# Patient Record
Sex: FEMALE | Race: WHITE | Employment: UNEMPLOYED | URBAN - METROPOLITAN AREA
[De-identification: names, ages, dates, MRNs, and addresses within clinical notes are randomized per-mention and may not be internally consistent; named-entity substitution may affect disease eponyms.]

---

## 2021-08-05 ENCOUNTER — PRENATAL OFFICE VISIT (OUTPATIENT)
Dept: OBGYN | Facility: CLINIC | Age: 36
End: 2021-08-05

## 2021-08-05 DIAGNOSIS — Z34.80 PRENATAL CARE, SUBSEQUENT PREGNANCY: ICD-10-CM

## 2021-08-05 PROCEDURE — 99207 PR NO CHARGE NURSE ONLY: CPT | Performed by: OBSTETRICS & GYNECOLOGY

## 2021-08-05 NOTE — PROGRESS NOTES
Lifestyle and nutrition teaching completed   [facility] OB Intake Nurse    Patient supplied answers from flow sheet for:  Prenatal OB Questionnaire.  Past Medical History  Have you ever recieved care for your mental health? : No  Have you ever been in a major accident or suffered serious trauma?: No  Within the last year, has anyone hit, slapped, kicked or otherwise hurt you?: No  In the last year, has anyone forced you to have sex when you didn't want to?: No    Past Medical History 2   Have you ever received a blood transfusion?: No  Would you accept a blood transfusion if was medically recommended?: Yes  Does anyone in your home smoke?: No   Is your blood type Rh negative?: Unknown  Have you ever ?: No  Have you been hospitalized for a nonsurgical reason excluding normal delivery?: No  Have you ever had an abnormal pap smear?: No    Past Medical History (Continued)  Do you have a history of abnormalities of the uterus?: No  Did your mother take GIAN or any other hormones when she was pregnant with you?: No  Do you have any other problems we have not asked about which you feel may be important to this pregnancy?: No

## 2021-08-10 PROBLEM — Z87.59 HISTORY OF PRETERM PREMATURE RUPTURE OF MEMBRANES (PPROM): Status: ACTIVE | Noted: 2021-08-10

## 2021-09-23 ENCOUNTER — PRENATAL OFFICE VISIT (OUTPATIENT)
Dept: OBGYN | Facility: CLINIC | Age: 36
End: 2021-09-23
Payer: COMMERCIAL

## 2021-09-23 VITALS
BODY MASS INDEX: 25.77 KG/M2 | HEIGHT: 61 IN | RESPIRATION RATE: 16 BRPM | HEART RATE: 91 BPM | TEMPERATURE: 97.7 F | SYSTOLIC BLOOD PRESSURE: 112 MMHG | WEIGHT: 136.5 LBS | DIASTOLIC BLOOD PRESSURE: 70 MMHG

## 2021-09-23 DIAGNOSIS — Z34.82 PRENATAL CARE, SUBSEQUENT PREGNANCY IN SECOND TRIMESTER: Primary | ICD-10-CM

## 2021-09-23 DIAGNOSIS — Z87.59 HISTORY OF PRETERM PREMATURE RUPTURE OF MEMBRANES (PPROM): ICD-10-CM

## 2021-09-23 LAB
ALBUMIN UR-MCNC: NEGATIVE MG/DL
APPEARANCE UR: CLEAR
BILIRUB UR QL STRIP: NEGATIVE
COLOR UR AUTO: YELLOW
ERYTHROCYTE [DISTWIDTH] IN BLOOD BY AUTOMATED COUNT: 12.3 % (ref 10–15)
GLUCOSE UR STRIP-MCNC: NEGATIVE MG/DL
HBV SURFACE AG SERPL QL IA: NONREACTIVE
HCT VFR BLD AUTO: 38.3 % (ref 35–47)
HCV AB SERPL QL IA: NONREACTIVE
HGB BLD-MCNC: 13 G/DL (ref 11.7–15.7)
HGB UR QL STRIP: ABNORMAL
HIV 1+2 AB+HIV1 P24 AG SERPL QL IA: NONREACTIVE
KETONES UR STRIP-MCNC: NEGATIVE MG/DL
LEUKOCYTE ESTERASE UR QL STRIP: NEGATIVE
MCH RBC QN AUTO: 32.4 PG (ref 26.5–33)
MCHC RBC AUTO-ENTMCNC: 33.9 G/DL (ref 31.5–36.5)
MCV RBC AUTO: 96 FL (ref 78–100)
NITRATE UR QL: NEGATIVE
PH UR STRIP: 6 [PH] (ref 5–7)
PLATELET # BLD AUTO: 294 10E3/UL (ref 150–450)
RBC # BLD AUTO: 4.01 10E6/UL (ref 3.8–5.2)
RBC #/AREA URNS AUTO: ABNORMAL /HPF
SP GR UR STRIP: <=1.005 (ref 1–1.03)
SQUAMOUS #/AREA URNS AUTO: ABNORMAL /LPF
T PALLIDUM AB SER QL: NONREACTIVE
UROBILINOGEN UR STRIP-ACNC: 0.2 E.U./DL
WBC # BLD AUTO: 10.9 10E3/UL (ref 4–11)
WBC #/AREA URNS AUTO: ABNORMAL /HPF

## 2021-09-23 PROCEDURE — 87086 URINE CULTURE/COLONY COUNT: CPT | Performed by: OBSTETRICS & GYNECOLOGY

## 2021-09-23 PROCEDURE — 86762 RUBELLA ANTIBODY: CPT | Performed by: OBSTETRICS & GYNECOLOGY

## 2021-09-23 PROCEDURE — 76815 OB US LIMITED FETUS(S): CPT | Performed by: OBSTETRICS & GYNECOLOGY

## 2021-09-23 PROCEDURE — 87624 HPV HI-RISK TYP POOLED RSLT: CPT | Performed by: OBSTETRICS & GYNECOLOGY

## 2021-09-23 PROCEDURE — 86780 TREPONEMA PALLIDUM: CPT | Performed by: OBSTETRICS & GYNECOLOGY

## 2021-09-23 PROCEDURE — 81001 URINALYSIS AUTO W/SCOPE: CPT | Performed by: OBSTETRICS & GYNECOLOGY

## 2021-09-23 PROCEDURE — 87491 CHLMYD TRACH DNA AMP PROBE: CPT | Performed by: OBSTETRICS & GYNECOLOGY

## 2021-09-23 PROCEDURE — 36415 COLL VENOUS BLD VENIPUNCTURE: CPT | Performed by: OBSTETRICS & GYNECOLOGY

## 2021-09-23 PROCEDURE — 87591 N.GONORRHOEAE DNA AMP PROB: CPT | Performed by: OBSTETRICS & GYNECOLOGY

## 2021-09-23 PROCEDURE — 86803 HEPATITIS C AB TEST: CPT | Performed by: OBSTETRICS & GYNECOLOGY

## 2021-09-23 PROCEDURE — 99203 OFFICE O/P NEW LOW 30 MIN: CPT | Mod: 25 | Performed by: OBSTETRICS & GYNECOLOGY

## 2021-09-23 PROCEDURE — 87340 HEPATITIS B SURFACE AG IA: CPT | Performed by: OBSTETRICS & GYNECOLOGY

## 2021-09-23 PROCEDURE — G0145 SCR C/V CYTO,THINLAYER,RESCR: HCPCS | Performed by: OBSTETRICS & GYNECOLOGY

## 2021-09-23 PROCEDURE — 87389 HIV-1 AG W/HIV-1&-2 AB AG IA: CPT | Performed by: OBSTETRICS & GYNECOLOGY

## 2021-09-23 PROCEDURE — 85027 COMPLETE CBC AUTOMATED: CPT | Performed by: OBSTETRICS & GYNECOLOGY

## 2021-09-23 RX ORDER — HYDROXYPROGESTERONE CAPROATE 250 MG/ML
250 INJECTION INTRAMUSCULAR
Qty: 5 ML | Refills: 11 | Status: ON HOLD | OUTPATIENT
Start: 2021-09-23 | End: 2021-12-30

## 2021-09-23 ASSESSMENT — MIFFLIN-ST. JEOR: SCORE: 1261.54

## 2021-09-23 NOTE — PATIENT INSTRUCTIONS
BIRTH AND 17-alpha hydroxyprogesterone caproate (17P) TREATMENT    What is  birth?      birth is the delivery of a baby before 37 weeks of gestation.  Approximately 1 in every 12 babies in [place] is born premature (that s approximately 6,000 babies every year)!     birth is often unexpected, but can happen for many reasons. There are some known risk factors, which include:   -pregnancy with twins or triplets   -being  race  -some problems with the uterus or cervix   -some medical problems like high blood pressure   -smoking, drinking, or using illegal drugs while pregnant   -infections like urinary tract infection, bacterial vaginosis and chlamydia while    pregnant  -depression, stress, and anxiety    But the biggest risk factor is having a previous  baby. In fact, women who have one  birth have a 30-50% chance of their next baby coming early too.     What are the risks to a baby that delivers prematurely?     birth is the number one cause of  death worldwide. This risk increases the earlier the baby is born.  Prematurity can also cause serious long term health problems for babies such as breathing problems, infections, bleeding into the brain, as well as long term developmental problems like cerebral palsy, learning impairments, hearing and vision problems.    How can I prevent  birth in my pregnancy?  Overall, the best thing to prevent  delivery is to stay healthy during your pregnancy, and follow up with your doctor for your regular visits and screening tests.  If you have a history of  birth in one of your past pregnancies, you may benefit from weekly injections of 17P.     What is 17P?  The full name is 17-alpha hydroxyprogesterone caproate. This is a form of your body s natural  pregnancy hormone , progesterone. The brand name of this is Rosemary, and it is FDA approved for prevention of  birth.  This medication  is given in once weekly injections from week 16-20 through the 36th week of pregnancy. Research shows that women taking 17P can reduce their risk of  birth from 50% to 36%. The treatment has also been shown to reduce the risk of complications after birth, such as bleeding in the brain and need for supplemental oxygen.    It is important to complete the treatment once you start, as the risk of  birth goes up if you stop the injections early.    How can I get started on the Rosemary treatment?  First, you should talk with your doctor to find out if this is a good option for you.  It is safe for pregnant women and for the fetus, but if you have some medical problems like uncontrolled blood pressure, breast cancer, or liver disease you should talk about it with your doctor first.  Your clinic will work with you to help determine insurance coverage and preauthorization if needed.  Then you will schedule weekly visits for 17P injections in addition to your routine prenatal visits with your doctor.    Side Effects of Mekena  The most common side effects  reported by Raemon users are   injection site reactions (pain [35%], swelling  [17%], pruritus (itching) [6%], nodule [5%]), urticaria (rash) (12%), pruritus (generalized itching (8%), nausea (6%), and diarrhea (2%).

## 2021-09-23 NOTE — PROGRESS NOTES
[facility]   OB/GYN Clinic    CC: New OB     Subjective:    Lara is a 35 year old  at 18w4d by 12w3d US us who presents for her initial OB visit. She reports feeling well. No VB or LOF, no ctx.       OB History    Para Term  AB Living   3 1 0 1 1 1   SAB TAB Ectopic Multiple Live Births   1 0 0 0 1      # Outcome Date GA Lbr Kosta/2nd Weight Sex Delivery Anes PTL Lv   3 Current            2  10/10/13 34w2d 04:00 / 00:22 2.32 kg (5 lb 1.8 oz) M Vag-Spont EPI  [place]      Complications:  premature rupture of membranes (PPROM) delivered, current hospitalization      Name: Marcial      Apgar1: 8     1 2011     SAB         Obstetric Comments   Missed    Born in [place]     Past Medical History:   Diagnosis Date     Asthma     as a child     Chickenpox      Congenital heart defect     born with hole in heart and murmur- no surgery needed per mother     History of urinary tract infection       premature rupture of membranes (PPROM)        Past Surgical History:   Procedure Laterality Date     D & C         No current outpatient medications on file.       Family History   Problem Relation Age of Onset     Coronary Artery Disease Father         MI- 8 stents     Stomach Cancer Brother      Cancer Maternal Grandmother         lung CA     Cancer Maternal Grandfather         skin and prostate     Respiratory Paternal Grandmother         emphysema     Cancer Paternal Grandfather         lung CA       Social History     Tobacco Use     Smoking status: Current Every Day Smoker     Packs/day: 1.00     Smokeless tobacco: Never Used     Tobacco comment: down to 5 cig/day with pregnancy   Substance Use Topics     Alcohol use: Not Currently     Comment: rare-quit with pregnancy       ROS: A 10 pt ROS was completed and found to be negative unless mentioned in the HPI.     Objective:  /70 (BP Location: Right arm, Cuff Size: Adult Regular)   Pulse 91   Temp 97.7  F  "(36.5  C)   Resp 16   Ht 1.549 m (5' 1\")   Wt 61.9 kg (136 lb 8 oz)   LMP  (LMP Unknown)   BMI 25.79 kg/m      Estimated body mass index is 25.79 kg/m  as calculated from the following:    Height as of this encounter: 1.549 m (5' 1\").    Weight as of this encounter: 61.9 kg (136 lb 8 oz).    Physical Exam:  Gen: Pleasant, talkative female in no apparent distress   Respiratory: Lungs clear, breathing comfortably on room air   Cardiac: Regular rate, warm and well-perfused.   GI: Abd soft and non-tender  : External genitalia is free of lesion. Urethra and bartholin glands normal.  Vaginal mucosa is moist and pink without unusual discharge.  Cervix is without lesions or discharge.  Pap smear and endocervical sampling obtained without incident. Uterus just below the umbilicus.   MSK: Grossly normal movement of all four extremities  Psych: mood and affect bright   Lower extremity: edema not present     Transabdominal US: molina IUP measuring 18wks, +cardiac activity at 136 bpm, grossly normal fetal movements and fluid          Assessment/Plan:   35 year old  at 18w4d by LMP of No LMP recorded (lmp unknown). Patient is pregnant. who presents for her initial OB visit.   1) Plan to draw new OB lab today (T&S, CBC, HIV, RPR, HepBsAg, Hep B antibody, rubella, GC/Chlam, UC)  2) Discussed routine prenatal care, group practice model at [facility], tertiary support and frequency of visits. Options for  testing for chromosomal and birth defects were discussed with the patient including nuchal translucency/blood marker testing in the first trimester, progenity and quad screening. She would like NIPT   3) Plan for dating/viability scan now - MIKE 2022  4) Concerns: none  5) PMH/OBHx problems:    - tobacco use; discussed risk of SIDs and poor birth growth,  delivery    - PPROM at 34 wks; recommended serial cervical and allyn injections   6) Medication review: no changes, continue prenatal " "vitamin   7) Reviewed ectopic and miscarriage precautions (present to ED or call clinic with abdominal pain, vaginal bleeding or fever)   8) Weight gain: discussed weight gain expectations (BMI 25-30: 15-25lbs)   9) PAP smear: collected today   10) Delivery plan: continue to discuss, breastfeeding, pp contraception, pain management in labor - continue to discuss  11) Immunizations: flu shot in the fall, Tdap at 28wks, COVID recommended  12) No increased risk for gestational diabetes for plan for screen at 28wks   13) Discussed risk of COVID in pregnancy including a doubled risk of needing ICU levels care, intubation or death. Recommended social distancing, mask-wearing and avoiding unnecessary contacts. I also recommended the COVID vaccine in pregnancy per CDCs recommendations.      Return to clinic in 4 weeks.     Maria Esther Rucekr MD  OB/GYN  2021        Evaluation for 17P   Is this current pregnancy a molina pregnancy? Yes  Has this patient experienced a spontaneous,  birth or  rupture of membranes between 20 - 37 weeks of a molina pregnancy? Yes    Both answers are \"Yes\" the patient may be a candidate for \"17P\" [place].       "

## 2021-09-24 ENCOUNTER — TELEPHONE (OUTPATIENT)
Dept: OBGYN | Facility: CLINIC | Age: 36
End: 2021-09-24

## 2021-09-24 LAB
BACTERIA UR CULT: NORMAL
C TRACH DNA SPEC QL PROBE+SIG AMP: NEGATIVE
N GONORRHOEA DNA SPEC QL NAA+PROBE: NEGATIVE
RUBV IGG SERPL QL IA: 4.34 INDEX
RUBV IGG SERPL QL IA: POSITIVE

## 2021-09-24 NOTE — TELEPHONE ENCOUNTER
[facility] called clinic and stated patient's Hydroxyprogesterone (Rosemary) is not covered through pharmacy.  She will have to be covered under physician buy and bill coverage.  We do have clinic stock for patient available.  Left message for patient to call us back to get her first injection scheduled. Patient will need to get injection at the [place] [facility] as [facility] and [place] [facility] have closed.

## 2021-09-27 LAB
BKR LAB AP GYN ADEQUACY: NORMAL
BKR LAB AP GYN INTERPRETATION: NORMAL
BKR LAB AP HPV REFLEX: NORMAL
BKR LAB AP PREVIOUS ABNORMAL: NORMAL
PATH REPORT.COMMENTS IMP SPEC: NORMAL
PATH REPORT.RELEVANT HX SPEC: NORMAL

## 2021-09-29 LAB
HUMAN PAPILLOMA VIRUS 16 DNA: NEGATIVE
HUMAN PAPILLOMA VIRUS 18 DNA: NEGATIVE
HUMAN PAPILLOMA VIRUS FINAL DIAGNOSIS: NORMAL
HUMAN PAPILLOMA VIRUS OTHER HR: NEGATIVE
SCANNED LAB RESULT: NORMAL

## 2021-09-29 NOTE — TELEPHONE ENCOUNTER
Tried calling patient again..Home number not working, cell number belongs to someone else.  Left message on cell number to have Lara Hernandez call us back.

## 2021-10-15 ENCOUNTER — HOSPITAL ENCOUNTER (OUTPATIENT)
Dept: ULTRASOUND IMAGING | Facility: CLINIC | Age: 36
Discharge: HOME | End: 2021-10-15
Attending: OBSTETRICS & GYNECOLOGY | Admitting: OBSTETRICS & GYNECOLOGY
Payer: COMMERCIAL

## 2021-10-15 DIAGNOSIS — Z87.59 HISTORY OF PRETERM PREMATURE RUPTURE OF MEMBRANES (PPROM): ICD-10-CM

## 2021-10-15 PROCEDURE — 76805 OB US >/= 14 WKS SNGL FETUS: CPT

## 2021-12-30 ENCOUNTER — HOSPITAL ENCOUNTER (OUTPATIENT)
Facility: CLINIC | Age: 36
End: 2021-12-30
Admitting: OBSTETRICS & GYNECOLOGY
Payer: COMMERCIAL

## 2021-12-30 ENCOUNTER — TELEPHONE (OUTPATIENT)
Dept: OBGYN | Facility: CLINIC | Age: 36
End: 2021-12-30
Payer: COMMERCIAL

## 2021-12-30 ENCOUNTER — HOSPITAL ENCOUNTER (OUTPATIENT)
Facility: CLINIC | Age: 36
Discharge: HOME | End: 2021-12-30
Attending: OBSTETRICS & GYNECOLOGY | Admitting: OBSTETRICS & GYNECOLOGY
Payer: COMMERCIAL

## 2021-12-30 VITALS
TEMPERATURE: 98.1 F | RESPIRATION RATE: 18 BRPM | DIASTOLIC BLOOD PRESSURE: 88 MMHG | HEART RATE: 107 BPM | SYSTOLIC BLOOD PRESSURE: 133 MMHG

## 2021-12-30 PROBLEM — Z36.89 ENCOUNTER FOR TRIAGE IN PREGNANT PATIENT: Status: ACTIVE | Noted: 2021-12-30

## 2021-12-30 LAB
ALBUMIN UR-MCNC: 30 MG/DL
APPEARANCE UR: ABNORMAL
BILIRUB UR QL STRIP: NEGATIVE
COLOR UR AUTO: YELLOW
GLUCOSE UR STRIP-MCNC: NEGATIVE MG/DL
HGB UR QL STRIP: NEGATIVE
KETONES UR STRIP-MCNC: 80 MG/DL
LEUKOCYTE ESTERASE UR QL STRIP: ABNORMAL
MUCOUS THREADS #/AREA URNS LPF: PRESENT /LPF
NITRATE UR QL: NEGATIVE
PH UR STRIP: 5 [PH] (ref 5–7)
RBC URINE: 7 /HPF
RUPTURE OF FETAL MEMBRANES BY ROM PLUS: POSITIVE
SARS-COV-2 RNA RESP QL NAA+PROBE: NEGATIVE
SP GR UR STRIP: 1.02 (ref 1–1.03)
SQUAMOUS EPITHELIAL: 50 /HPF
UROBILINOGEN UR STRIP-MCNC: NORMAL MG/DL
WBC URINE: 22 /HPF

## 2021-12-30 PROCEDURE — 250N000011 HC RX IP 250 OP 636: Performed by: OBSTETRICS & GYNECOLOGY

## 2021-12-30 PROCEDURE — G0463 HOSPITAL OUTPT CLINIC VISIT: HCPCS | Mod: 25

## 2021-12-30 PROCEDURE — 81001 URINALYSIS AUTO W/SCOPE: CPT | Performed by: OBSTETRICS & GYNECOLOGY

## 2021-12-30 PROCEDURE — 84112 EVAL AMNIOTIC FLUID PROTEIN: CPT | Performed by: OBSTETRICS & GYNECOLOGY

## 2021-12-30 PROCEDURE — C9803 HOPD COVID-19 SPEC COLLECT: HCPCS

## 2021-12-30 PROCEDURE — U0005 INFEC AGEN DETEC AMPLI PROBE: HCPCS | Performed by: OBSTETRICS & GYNECOLOGY

## 2021-12-30 PROCEDURE — 87106 FUNGI IDENTIFICATION YEAST: CPT | Performed by: OBSTETRICS & GYNECOLOGY

## 2021-12-30 PROCEDURE — 59025 FETAL NON-STRESS TEST: CPT | Mod: 26 | Performed by: OBSTETRICS & GYNECOLOGY

## 2021-12-30 PROCEDURE — 96372 THER/PROPH/DIAG INJ SC/IM: CPT | Performed by: OBSTETRICS & GYNECOLOGY

## 2021-12-30 PROCEDURE — 87077 CULTURE AEROBIC IDENTIFY: CPT | Performed by: OBSTETRICS & GYNECOLOGY

## 2021-12-30 PROCEDURE — 59025 FETAL NON-STRESS TEST: CPT

## 2021-12-30 RX ORDER — BETAMETHASONE SODIUM PHOSPHATE AND BETAMETHASONE ACETATE 3; 3 MG/ML; MG/ML
12 INJECTION, SUSPENSION INTRA-ARTICULAR; INTRALESIONAL; INTRAMUSCULAR; SOFT TISSUE ONCE
Status: COMPLETED | OUTPATIENT
Start: 2021-12-30 | End: 2021-12-30

## 2021-12-30 RX ORDER — LIDOCAINE 40 MG/G
CREAM TOPICAL
Status: DISCONTINUED | OUTPATIENT
Start: 2021-12-30 | End: 2021-12-30 | Stop reason: HOSPADM

## 2021-12-30 RX ADMIN — BETAMETHASONE SODIUM PHOSPHATE AND BETAMETHASONE ACETATE 12 MG: 3; 3 INJECTION, SUSPENSION INTRA-ARTICULAR; INTRALESIONAL; INTRAMUSCULAR at 13:30

## 2021-12-30 NOTE — PLAN OF CARE
S:Patient presents due to  pelvic pressure and fluid leaking .  B:32w4d   Allergies: Patient has no known allergies.  A:A:moderate variablility, + accels, no decels, Category I  Irregular contractions mild to palpation    Dr. AMIRA Galvin in department and orders received.  Plan includes; Monitor, observe and reevaluate, UA and ROM+ . Reviewed with patient and she agrees with plan. Patient laid low fowlers to allow for pooling. Mucus noted and small amount of vaginal fluid. Rom + sent to lab for evaluation.       Prenatal Breastfeeding Education Toolkit provided for patient to review,helping her to make an informed decision on a feeding choice for her baby. Patient accepted. Questions answered.    Oriented to room and call light.

## 2021-12-30 NOTE — TELEPHONE ENCOUNTER
Reason for Call:  Other call back    Detailed comments: Pt calling - about 29 weeks pregnant.  States she hasn't been able to go to the bathroom - constipated for the last month.  Feels like she has to urinate - and just a couple of drips come out - for the past week.  Also states she went into [facility] about a week ago for bladder infection - urine was very dark - looked like there could have been some blood in it.  Went to [facility] - borderline of bladder infection - has not heard back from them.  Also states she felt damp in underwear when she woke up this morning.  Has an appt - wondering if she should maybe be seen sooner for?    Phone Number Patient can be reached at: [phone]  Best Time:     Can we leave a detailed message on this number? YES    Call taken on 12/30/2021 at 9:54 AM by Renata Schulz

## 2021-12-30 NOTE — TELEPHONE ENCOUNTER
"Spoke with patient on the phone.    S-(situation): Patient calling concerned with dampness in her underpants. Patient reports she woke up this morning and noticed she was \" wet down there.\" Patient reports when she lays down she can \" feel something dripping out.\" Patient reports when she tries to go to the bathroom, \" nothing comes out but drips.\" Patient unsure of the color of her urine, sometimes she says it looks red or brown. Patient reports she feels most of her fetal activity at night and \"not much during the day.\" Patient reports she felt fetal movement last night but has not felt any movement yet today.    B-(background):  at 32w4d, have seen for prenatla care x1 visit in clinic at 18 weeks, Hx of PPROM delivery at 34w2d    A-(assessment): ?SROM     R-(recommendations): Huddled with Dr. Galvin and [facility] charge RN. Will have [facility] go to [facility] for further evaluation. Patient will be there in 60-90 minutes. Patient understanding of where to go.    Echo Mcmullen   Ob/Gyn Clinic  RN      "

## 2021-12-30 NOTE — PLAN OF CARE
Patients ROM + came back positive. Patient requests a tertiary hospital closer to her home instead of going to the [facility]. Attempted to find placement with [place] with no ability to accept patient.  Patient agreed to go to [place] and [facility] able to accept patient. Patient being transferred via private vehicle to [facility] for PPROM/ labor.

## 2022-01-03 ENCOUNTER — TELEPHONE (OUTPATIENT)
Dept: OBGYN | Facility: CLINIC | Age: 37
End: 2022-01-03
Payer: COMMERCIAL

## 2022-01-03 DIAGNOSIS — Z87.59 HISTORY OF PRETERM PREMATURE RUPTURE OF MEMBRANES (PPROM): Primary | ICD-10-CM

## 2022-01-03 LAB
BACTERIA UR CULT: ABNORMAL
BACTERIA UR CULT: ABNORMAL

## 2022-01-03 NOTE — TELEPHONE ENCOUNTER
Pt currently 33w1d, admitted at [facility] and in influenza precautions.    Dr. Landaverde (on call at [facility] today) advised pt needs BPP on 1/7 or 1/10 and to be seen weekly following this.    RN routing to [place] triage RNs to contact Dr. Landaverde for further questions and assist in scheduling pt. RN placed BPP order.    Veronica Pruett RN on 1/3/2022 at 10:59 AM

## 2022-01-03 NOTE — TELEPHONE ENCOUNTER
Attempted to reach patient.  Call goes right to message that voice mail is set up so message cannot be left.  Unable to reach patient.    Echo Mcmullen   Ob/Gyn Clinic  RN

## 2022-01-03 NOTE — PROGRESS NOTES
OB/GYN Progress Note:    Spoke with Dr. Andreea Landaverde at [facility] on the phone regarding follow up care for this patient. Patient was transferred to [facility] on 21 with suspected PPROM. Patient was given course of Betamethasone and latency antibiotics. She was reevaluated for PPROM. US completed twice over the past few days and amniotic fluid levels normal. Reevaluation with speculum exam revealed no pooling, negative ferning, Amnisure negative. Wet prep positive for yeast infection, which she was given treatment for. Subjectively, leakage of fluid more consistent with vaginal discharge. MFM was consulted, suspect false positive ROM+ and membranes are intact with no other signs of rupture at this point. They find her stable for discharge with close outpatient follow up. Patient is at high risk of  labor and PPROM due to history of this at 34 weeks. [facility] recommends weekly clinic appointments and weekly US for fluid check. Orders have been placed for BPPs. Will reach out to patient to schedule.    Mary Guerra DO

## 2022-01-03 NOTE — TELEPHONE ENCOUNTER
Called patient to assist with weekly clinic appointments and give phone number for [facility] for weekly BPP.    Unable to reach patient.  Unable to leave a message.  Voice mail not set up.    Will need to re-try later.  Will also have scheduling AFR try to reach patient to schedule weekly visits.  Staff message sent.

## 2022-01-03 NOTE — TELEPHONE ENCOUNTER
Dr. Guerra contacting Dr. Landaverde.  Will clarify orders for weekly BPP or just the one?    Patient will need to call [facility] for an appointment for BPP.  Unable to schedule through the clinic.    Will have AFR assist patient with clinic appointments - if unable to find appointments- clinic RN can assist.    Echo Mcmullen   Ob/Gyn Clinic  RN

## 2022-01-04 LAB — BACTERIA SPEC CULT: ABNORMAL

## 2022-01-04 NOTE — TELEPHONE ENCOUNTER
Call placed to patient to assist with scheduling.  Immediately goes to voice mail but mail box is not set up.    Will need to try to re-call patient again.    Echo Mcmullen   Ob/Gyn Clinic  RN

## 2022-01-04 NOTE — TELEPHONE ENCOUNTER
Attempted to reach patient for follow up appointments.  Call went straight to voicemail but mailbox not set up yet.    Echo Mcmullen   Ob/Gyn Clinic  DANITA

## 2022-01-05 NOTE — TELEPHONE ENCOUNTER
Gamaliel placed to reach patient to schedule clinic appointment and assist with BPP.    Call goes right to message that patient unavailable and voice mail box has not been set up.    Will try to recall again later.    Echo Mcmullen   Ob/Gyn Clinic  RN

## 2022-01-07 NOTE — TELEPHONE ENCOUNTER
Patient scheduled appointment for Monday 1/10/2022  BPP scheduled for after appointment at 2:10 pm.  Called patient to notify.  Unable to reach.  Unable to leave a message as voice mailbox not set up.    Echo Mcmullen   Ob/Gyn Clinic  RN

## 2022-01-10 ENCOUNTER — PRENATAL OFFICE VISIT (OUTPATIENT)
Dept: OBGYN | Facility: CLINIC | Age: 37
End: 2022-01-10
Payer: COMMERCIAL

## 2022-01-10 VITALS
BODY MASS INDEX: 30.43 KG/M2 | DIASTOLIC BLOOD PRESSURE: 60 MMHG | TEMPERATURE: 99 F | SYSTOLIC BLOOD PRESSURE: 96 MMHG | HEIGHT: 61 IN | HEART RATE: 98 BPM | RESPIRATION RATE: 16 BRPM | WEIGHT: 161.2 LBS

## 2022-01-10 DIAGNOSIS — Z34.83 PRENATAL CARE, SUBSEQUENT PREGNANCY IN THIRD TRIMESTER: Primary | ICD-10-CM

## 2022-01-10 DIAGNOSIS — O09.33 LIMITED PRENATAL CARE IN THIRD TRIMESTER: ICD-10-CM

## 2022-01-10 DIAGNOSIS — Z87.59 HISTORY OF PRETERM PREMATURE RUPTURE OF MEMBRANES (PPROM): ICD-10-CM

## 2022-01-10 PROBLEM — Z36.89 ENCOUNTER FOR TRIAGE IN PREGNANT PATIENT: Status: RESOLVED | Noted: 2021-12-30 | Resolved: 2022-01-10

## 2022-01-10 PROCEDURE — 99212 OFFICE O/P EST SF 10 MIN: CPT | Performed by: OBSTETRICS & GYNECOLOGY

## 2022-01-10 RX ORDER — VITAMIN A, VITAMIN C, VITAMIN D, VITAMIN E, THIAMINE, RIBOFLAVIN, NIACIN, VITAMIN B6, FOLIC ACID, VITAMIN B12, CALCIUM, IRON, ZINC, COPPER 4000; 120; 400; 22; 1.84; 3; 20; 10; 1; 12; 200; 27; 25; 2 [IU]/1; MG/1; [IU]/1; [IU]/1; MG/1; MG/1; MG/1; MG/1; MG/1; UG/1; MG/1; MG/1; MG/1; MG/1
1 TABLET ORAL DAILY
COMMUNITY
Start: 2022-01-04

## 2022-01-10 ASSESSMENT — MIFFLIN-ST. JEOR: SCORE: 1368.58

## 2022-01-10 NOTE — NURSING NOTE
"Initial BP 96/60 (BP Location: Right arm, Patient Position: Chair, Cuff Size: Adult Regular)   Pulse 98   Temp 99  F (37.2  C) (Tympanic)   Resp 16   Ht 1.549 m (5' 1\")   Wt 73.1 kg (161 lb 3.2 oz)   LMP 05/10/2021   Breastfeeding No   BMI 30.46 kg/m   Estimated body mass index is 30.46 kg/m  as calculated from the following:    Height as of this encounter: 1.549 m (5' 1\").    Weight as of this encounter: 73.1 kg (161 lb 3.2 oz). .    Nessa Delacruz, ALTHEA    "

## 2022-01-12 ENCOUNTER — HOSPITAL ENCOUNTER (OUTPATIENT)
Dept: ULTRASOUND IMAGING | Facility: CLINIC | Age: 37
Discharge: HOME | End: 2022-01-12
Attending: OBSTETRICS & GYNECOLOGY | Admitting: OBSTETRICS & GYNECOLOGY
Payer: COMMERCIAL

## 2022-01-12 DIAGNOSIS — Z87.59 HISTORY OF PRETERM PREMATURE RUPTURE OF MEMBRANES (PPROM): ICD-10-CM

## 2022-01-12 PROCEDURE — 76819 FETAL BIOPHYS PROFIL W/O NST: CPT

## 2022-01-19 ENCOUNTER — HOSPITAL ENCOUNTER (OUTPATIENT)
Dept: ULTRASOUND IMAGING | Facility: CLINIC | Age: 37
Discharge: HOME | End: 2022-01-19
Attending: OBSTETRICS & GYNECOLOGY | Admitting: OBSTETRICS & GYNECOLOGY
Payer: COMMERCIAL

## 2022-01-19 DIAGNOSIS — Z87.59 HISTORY OF PRETERM PREMATURE RUPTURE OF MEMBRANES (PPROM): ICD-10-CM

## 2022-01-19 PROCEDURE — 76819 FETAL BIOPHYS PROFIL W/O NST: CPT

## 2022-01-26 ENCOUNTER — HOSPITAL ENCOUNTER (OUTPATIENT)
Dept: ULTRASOUND IMAGING | Facility: CLINIC | Age: 37
Discharge: HOME | End: 2022-01-26
Attending: OBSTETRICS & GYNECOLOGY | Admitting: OBSTETRICS & GYNECOLOGY
Payer: COMMERCIAL

## 2022-01-26 DIAGNOSIS — Z87.59 HISTORY OF PRETERM PREMATURE RUPTURE OF MEMBRANES (PPROM): ICD-10-CM

## 2022-01-26 PROCEDURE — 76819 FETAL BIOPHYS PROFIL W/O NST: CPT

## 2022-02-08 ENCOUNTER — PRENATAL OFFICE VISIT (OUTPATIENT)
Dept: OBGYN | Facility: CLINIC | Age: 37
End: 2022-02-08
Payer: COMMERCIAL

## 2022-02-08 VITALS
SYSTOLIC BLOOD PRESSURE: 118 MMHG | BODY MASS INDEX: 31.25 KG/M2 | DIASTOLIC BLOOD PRESSURE: 78 MMHG | TEMPERATURE: 98 F | RESPIRATION RATE: 14 BRPM | HEIGHT: 61 IN | WEIGHT: 165.5 LBS | HEART RATE: 89 BPM

## 2022-02-08 DIAGNOSIS — Z34.83 PRENATAL CARE, SUBSEQUENT PREGNANCY IN THIRD TRIMESTER: Primary | ICD-10-CM

## 2022-02-08 DIAGNOSIS — O09.33 LIMITED PRENATAL CARE IN THIRD TRIMESTER: ICD-10-CM

## 2022-02-08 DIAGNOSIS — Z87.59 HISTORY OF PRETERM PREMATURE RUPTURE OF MEMBRANES (PPROM): ICD-10-CM

## 2022-02-08 PROCEDURE — 99212 OFFICE O/P EST SF 10 MIN: CPT | Performed by: OBSTETRICS & GYNECOLOGY

## 2022-02-08 ASSESSMENT — MIFFLIN-ST. JEOR: SCORE: 1388.08

## 2022-02-08 NOTE — PROGRESS NOTES
"[facility] OB/GYN Clinic    Return OB Note    CC: Return OB     Subjective:  Lara is a 36 year old  at 38w2d   Denies vaginal bleeding, loss of fluid, or regular contractions. Good fetal movement.  Complaints today: More pelvic pressure.    Objective:  /78 (BP Location: Left arm, Patient Position: Sitting, Cuff Size: Adult Regular)   Pulse 89   Temp 98  F (36.7  C) (Tympanic)   Resp 14   Ht 1.549 m (5' 1\")   Wt 75.1 kg (165 lb 8 oz)   LMP 05/10/2021   BMI 31.27 kg/m      Fundal height: 37cm  FHT: 125bpm  SVE:     Assessment/Plan:   Encounter Diagnoses   Name Primary?     Prenatal care, subsequent pregnancy in third trimester Yes     Limited prenatal care in third trimester      History of  premature rupture of membranes (PPROM)        IUP at 38w2d  -S/p admission for possible PPROM. S/p BMZ, latency antibiotics. Reevaluation thought to be false positive. M recommended weekly office visits and BPPs, have not been done due to limited prenatal care.   -Limited prenatal care: Has had two office visits. Has been counseled on high risk nature of pregnancy. Has not completed GDM screening.   -History of PPROM with PTD at 34 weeks: cervical length was normal at 21 weeks. No further intervention has been pursued  due to limited prenatal care.  -GBS negative (results from 2022 in Care Everywhere)  -Strict return precautions given    RTC 1 weeks    Mary Guerra DO"

## 2022-02-08 NOTE — NURSING NOTE
"Initial /78 (BP Location: Left arm, Patient Position: Sitting, Cuff Size: Adult Regular)   Pulse 89   Temp 98  F (36.7  C) (Tympanic)   Resp 14   Ht 1.549 m (5' 1\")   Wt 75.1 kg (165 lb 8 oz)   LMP 05/10/2021   BMI 31.27 kg/m   Estimated body mass index is 31.27 kg/m  as calculated from the following:    Height as of this encounter: 1.549 m (5' 1\").    Weight as of this encounter: 75.1 kg (165 lb 8 oz). .      "

## 2023-11-20 NOTE — PROGRESS NOTES
"[facility] OB/GYN Clinic    Return OB Note    CC: Return OB     Subjective:  Lara is a 36 year old  at 34w1d   Denies vaginal bleeding, loss of fluid, or regular contractions. Good fetal movement.  Complaints today: None    Objective:  BP 96/60 (BP Location: Right arm, Patient Position: Chair, Cuff Size: Adult Regular)   Pulse 98   Temp 99  F (37.2  C) (Tympanic)   Resp 16   Ht 1.549 m (5' 1\")   Wt 73.1 kg (161 lb 3.2 oz)   LMP 05/10/2021   Breastfeeding No   BMI 30.46 kg/m      Fundal height: 33cm  FHT: 150bpm    Assessment/Plan:   Encounter Diagnoses   Name Primary?     Prenatal care, subsequent pregnancy in third trimester Yes     History of  premature rupture of membranes (PPROM)      Limited prenatal care in third trimester        IUP at 34w1d  -Patient recently admitted for possible PPROM. S/p BMZ, latency antibiotics. Reevaluation upon admission revealed suspected false positive ROM+ with no other signs of ruptured membranes. M consultation recommends weekly clinic visits and BPPs for fluid check. Discussed recommendations with patient. We were able to schedule her for a BPP this afternoon but she is unable to stay for the ultrasound. She lives an hour away and needs to  her child from school. Discussed high risk nature of pregnancy and importance on follow up.  -Limited prenatal care: Has been seen in office once prior to today. Came today thinking she would be getting her ultrasound but unfortunately this is not scheduled until later in the afternoon. Unable to stay for glucola and mid trimester labs today due to  issues. Again expressed importance of follow up in setting of high risk pregnancy.  -History of PPROM with PTD at 34 weeks: cervical length was normal at 21 weeks. No further intervention has been pursued  due to limited prenatal care.  -Strict return precautions given    RTC 1 weeks    Mary Guerra DO"
Per the CDC recommendations persons with COVID-19 who have symptoms and were directed to the care for themselves at home may discontinue isolation of the following conditions. Stay home for 5 days. If you have no symptoms or your symptoms are resolving after 5 days you can leave your house. Continue to wear a mask around others for 5 additional days. If you have a fever continue stay at home until your fever resolves. If you are diagnosed with Covid, refrain from exercise until approved by your primary care physician. Please call your primary care provider within 2 to 3 days of your discharge to arrange a telehealth follow-up. CDC does not recommend repeat testing after positive test.  Take Tylenol and ibuprofen as needed for fever, body aches and chills.   Over-the-counter multivitamins, take melatonin at night
no